# Patient Record
Sex: FEMALE | Race: WHITE | NOT HISPANIC OR LATINO | URBAN - METROPOLITAN AREA
[De-identification: names, ages, dates, MRNs, and addresses within clinical notes are randomized per-mention and may not be internally consistent; named-entity substitution may affect disease eponyms.]

---

## 2020-07-10 ENCOUNTER — OFFICE VISIT (OUTPATIENT)
Dept: URBAN - METROPOLITAN AREA CLINIC 63 | Facility: CLINIC | Age: 70
End: 2020-07-10

## 2020-08-07 ENCOUNTER — OFFICE VISIT (OUTPATIENT)
Dept: URBAN - METROPOLITAN AREA SURGERY CENTER 4 | Facility: SURGERY CENTER | Age: 70
End: 2020-08-07

## 2020-08-11 LAB — PATHOLOGY (INDENTED REPORT): (no result)

## 2020-08-24 ENCOUNTER — OFFICE VISIT (OUTPATIENT)
Dept: URBAN - METROPOLITAN AREA CLINIC 63 | Facility: CLINIC | Age: 70
End: 2020-08-24

## 2022-07-09 ENCOUNTER — TELEPHONE ENCOUNTER (OUTPATIENT)
Dept: URBAN - METROPOLITAN AREA CLINIC 121 | Facility: CLINIC | Age: 72
End: 2022-07-09

## 2022-07-10 ENCOUNTER — TELEPHONE ENCOUNTER (OUTPATIENT)
Dept: URBAN - METROPOLITAN AREA CLINIC 121 | Facility: CLINIC | Age: 72
End: 2022-07-10

## 2022-07-10 RX ORDER — AMITRIPTYLINE HYDROCHLORIDE 10 MG/1
TABLET, FILM COATED ORAL TAKE AS DIRECTED
Refills: 0 | Status: ACTIVE | COMMUNITY
Start: 2020-07-10

## 2024-06-05 ENCOUNTER — TELEPHONE ENCOUNTER (OUTPATIENT)
Dept: URBAN - METROPOLITAN AREA CLINIC 63 | Facility: CLINIC | Age: 74
End: 2024-06-05

## 2024-07-18 ENCOUNTER — OFFICE VISIT (OUTPATIENT)
Dept: URBAN - METROPOLITAN AREA CLINIC 60 | Facility: CLINIC | Age: 74
End: 2024-07-18
Payer: MEDICARE

## 2024-07-18 ENCOUNTER — DASHBOARD ENCOUNTERS (OUTPATIENT)
Age: 74
End: 2024-07-18

## 2024-07-18 VITALS
HEIGHT: 64 IN | HEART RATE: 87 BPM | TEMPERATURE: 97.8 F | BODY MASS INDEX: 32.44 KG/M2 | OXYGEN SATURATION: 97 % | DIASTOLIC BLOOD PRESSURE: 68 MMHG | WEIGHT: 190 LBS | SYSTOLIC BLOOD PRESSURE: 140 MMHG | RESPIRATION RATE: 12 BRPM

## 2024-07-18 DIAGNOSIS — R14.0 ABDOMINAL BLOATING: ICD-10-CM

## 2024-07-18 DIAGNOSIS — R10.13 DYSPEPSIA: ICD-10-CM

## 2024-07-18 DIAGNOSIS — R14.3 EXCESSIVE GAS: ICD-10-CM

## 2024-07-18 DIAGNOSIS — R19.5 LOOSE STOOLS: ICD-10-CM

## 2024-07-18 PROCEDURE — 99204 OFFICE O/P NEW MOD 45 MIN: CPT

## 2024-07-18 RX ORDER — FAMOTIDINE 20 MG/1
1 TABLET AT BEDTIME AS NEEDED TABLET, FILM COATED ORAL ONCE A DAY
Qty: 90 TABLET | Refills: 0 | OUTPATIENT
Start: 2024-07-18

## 2024-07-18 NOTE — HPI-TODAY'S VISIT:
Patient is a 74-year-old female who presents today with complaints of intermittent loose stools, abdominal bloating/cramping and excessive gas.  Patient states that for the past 6-7 months she has been having what she calls cyclic changes in bowel habits.  She states that every couple of weeks she will start having loose stool with incomplete evacuation, excessive gas, abdominal bloating/cramping and nausea.  She states that these episodes last 5-6 days before improving then after couple weeks they started over again.  She states that nothing makes the symptoms better or worse and she has not found any triggers.  She denies dysphagia, vomiting, diarrhea, constipation, hematochezia, melena and unintentional weight loss.  She has not tried any OTC medications for this and does not currently take a fiber supplement.  She does consume dairy products however she does not drink carbonated beverages.  Her last colonoscopy was in 2020 with 2 colon polyps and a recall of 5 years. She also states that she has been having some excessive belching however denies acid reflux symptoms.  She states that she did have trouble with acid reflux at one point but through dietary changes and lifestyle modifications she has been able to control this.  She does not take any medication for acid reduction. . Colonoscopy 8/7/2020 - Nonbleeding internal hemorrhoids - Moderate diverticulosis in the entire examined colon, no evidence of diverticular bleeding - One 6 mm polyp in the cecum, removed with cold snare (polyp tissue not retrieved) - One 5 mm polyp in the rectum, removed with cold snare - Pathology: Rectum colon polyp-hyperplastic polyp - Repeat colonoscopy in 5 years

## 2024-07-18 NOTE — PHYSICAL EXAM GASTROINTESTINAL
Abdomen , soft, minimal diffuse abdominal tenderness, nondistended , no guarding or rigidity , no masses palpable , normal bowel sounds , Liver and Spleen,  no hepatosplenomegaly , liver nontender

## 2024-09-18 ENCOUNTER — OFFICE VISIT (OUTPATIENT)
Dept: URBAN - METROPOLITAN AREA CLINIC 60 | Facility: CLINIC | Age: 74
End: 2024-09-18